# Patient Record
Sex: FEMALE | Race: WHITE | NOT HISPANIC OR LATINO | Employment: OTHER | URBAN - METROPOLITAN AREA
[De-identification: names, ages, dates, MRNs, and addresses within clinical notes are randomized per-mention and may not be internally consistent; named-entity substitution may affect disease eponyms.]

---

## 2017-02-02 ENCOUNTER — COMMUNICATION - HEALTHEAST (OUTPATIENT)
Dept: INTERNAL MEDICINE | Facility: CLINIC | Age: 62
End: 2017-02-02

## 2017-05-29 ENCOUNTER — COMMUNICATION - HEALTHEAST (OUTPATIENT)
Dept: INTERNAL MEDICINE | Facility: CLINIC | Age: 62
End: 2017-05-29

## 2017-09-24 ENCOUNTER — COMMUNICATION - HEALTHEAST (OUTPATIENT)
Dept: INTERNAL MEDICINE | Facility: CLINIC | Age: 62
End: 2017-09-24

## 2018-01-05 ENCOUNTER — COMMUNICATION - HEALTHEAST (OUTPATIENT)
Dept: INTERNAL MEDICINE | Facility: CLINIC | Age: 63
End: 2018-01-05

## 2018-01-05 RX ORDER — CELECOXIB 200 MG/1
CAPSULE ORAL
Qty: 90 CAPSULE | Refills: 1 | Status: SHIPPED | OUTPATIENT
Start: 2018-01-05

## 2018-01-10 ENCOUNTER — COMMUNICATION - HEALTHEAST (OUTPATIENT)
Dept: INTERNAL MEDICINE | Facility: CLINIC | Age: 63
End: 2018-01-10

## 2019-04-24 ENCOUNTER — AMBULATORY - HEALTHEAST (OUTPATIENT)
Dept: CARDIAC REHAB | Facility: HOSPITAL | Age: 64
End: 2019-04-24

## 2019-04-24 DIAGNOSIS — Z95.5 STENTED CORONARY ARTERY: ICD-10-CM

## 2019-04-26 ENCOUNTER — AMBULATORY - HEALTHEAST (OUTPATIENT)
Dept: CARDIAC REHAB | Facility: HOSPITAL | Age: 64
End: 2019-04-26

## 2019-04-26 DIAGNOSIS — Z95.5 STATUS POST INSERTION OF DRUG-ELUTING STENT INTO LEFT ANTERIOR DESCENDING (LAD) ARTERY: ICD-10-CM

## 2019-04-26 DIAGNOSIS — Z95.5 STENTED CORONARY ARTERY: ICD-10-CM

## 2019-04-26 RX ORDER — CLOPIDOGREL BISULFATE 75 MG/1
75 TABLET ORAL DAILY
Status: SHIPPED | COMMUNITY
Start: 2019-04-26

## 2019-04-26 RX ORDER — NITROGLYCERIN 0.4 MG/1
0.4 TABLET SUBLINGUAL EVERY 5 MIN PRN
Status: SHIPPED | COMMUNITY
Start: 2019-04-26

## 2019-04-26 RX ORDER — ASPIRIN 81 MG/1
81 TABLET, CHEWABLE ORAL DAILY
Status: SHIPPED | COMMUNITY
Start: 2019-04-26

## 2019-04-26 RX ORDER — ATORVASTATIN CALCIUM 80 MG/1
80 TABLET, FILM COATED ORAL AT BEDTIME
Status: SHIPPED | COMMUNITY
Start: 2019-04-26

## 2019-05-07 ENCOUNTER — AMBULATORY - HEALTHEAST (OUTPATIENT)
Dept: CARDIAC REHAB | Facility: HOSPITAL | Age: 64
End: 2019-05-07

## 2019-05-07 DIAGNOSIS — Z95.5 STENTED CORONARY ARTERY: ICD-10-CM

## 2019-05-07 DIAGNOSIS — Z95.5 STATUS POST INSERTION OF DRUG-ELUTING STENT INTO LEFT ANTERIOR DESCENDING (LAD) ARTERY: ICD-10-CM

## 2019-05-07 LAB — GLUCOSE BLDC GLUCOMTR-MCNC: 128 MG/DL (ref 70–139)

## 2019-05-08 ENCOUNTER — AMBULATORY - HEALTHEAST (OUTPATIENT)
Dept: CARDIAC REHAB | Facility: HOSPITAL | Age: 64
End: 2019-05-08

## 2019-05-08 DIAGNOSIS — Z95.5 STENTED CORONARY ARTERY: ICD-10-CM

## 2019-05-08 DIAGNOSIS — Z95.5 STATUS POST INSERTION OF DRUG-ELUTING STENT INTO LEFT ANTERIOR DESCENDING (LAD) ARTERY: ICD-10-CM

## 2019-05-14 ENCOUNTER — AMBULATORY - HEALTHEAST (OUTPATIENT)
Dept: CARDIAC REHAB | Facility: HOSPITAL | Age: 64
End: 2019-05-14

## 2019-05-14 DIAGNOSIS — Z95.5 STENTED CORONARY ARTERY: ICD-10-CM

## 2019-05-15 ENCOUNTER — AMBULATORY - HEALTHEAST (OUTPATIENT)
Dept: CARDIAC REHAB | Facility: HOSPITAL | Age: 64
End: 2019-05-15

## 2019-05-15 DIAGNOSIS — Z95.5 STENTED CORONARY ARTERY: ICD-10-CM

## 2019-06-04 ENCOUNTER — AMBULATORY - HEALTHEAST (OUTPATIENT)
Dept: CARDIAC REHAB | Facility: HOSPITAL | Age: 64
End: 2019-06-04

## 2019-06-04 DIAGNOSIS — Z95.5 STENTED CORONARY ARTERY: ICD-10-CM

## 2019-06-05 ENCOUNTER — AMBULATORY - HEALTHEAST (OUTPATIENT)
Dept: CARDIAC REHAB | Facility: HOSPITAL | Age: 64
End: 2019-06-05

## 2019-06-05 DIAGNOSIS — Z95.5 STENTED CORONARY ARTERY: ICD-10-CM

## 2019-06-11 ENCOUNTER — AMBULATORY - HEALTHEAST (OUTPATIENT)
Dept: CARDIAC REHAB | Facility: HOSPITAL | Age: 64
End: 2019-06-11

## 2019-06-11 DIAGNOSIS — Z95.5 STENTED CORONARY ARTERY: ICD-10-CM

## 2019-11-21 ENCOUNTER — COMMUNICATION - HEALTHEAST (OUTPATIENT)
Dept: EMERGENCY MEDICINE | Facility: CLINIC | Age: 64
End: 2019-11-21

## 2019-11-21 DIAGNOSIS — J02.0 STREP THROAT: ICD-10-CM

## 2021-05-26 ENCOUNTER — RECORDS - HEALTHEAST (OUTPATIENT)
Dept: ADMINISTRATIVE | Facility: CLINIC | Age: 66
End: 2021-05-26

## 2021-05-28 ENCOUNTER — RECORDS - HEALTHEAST (OUTPATIENT)
Dept: ADMINISTRATIVE | Facility: CLINIC | Age: 66
End: 2021-05-28

## 2021-05-28 NOTE — PROGRESS NOTES
"ITP ASSESSMENT   Assessment Day: Initial    Session Number: 1&2  Precautions: standard cardiac sx/DM/arthritis    Diagnosis: Stent    Risk Stratification: High    Referring Provider: Adore Chan PA-C  EXERCISE  Exercise Assessment: Initial       6 Minute Walk Test   Pre   Pre Exercise HR: 70                    Pre Exercise BP: 142/80      Peak  Peak HR: 106                   Peak BP: 168/86    Peak feet: 1300    Peak O2 SAT: 98    Peak RPE: 11    Peak MPH: 2.46      Symptoms:  Peak Symptoms: none      5 mins. Post  5 Min Post HR: 74    5 Min Post BP: 140/79                           Exercise Plan  Goals Next 30 days  ADL'S: Pt. will toerate spring yard clean-up without sx.    Leisure: Pt. will return to \"playing and chasing \" with her grandchildren.      Education Goals: All goals in this section met    Education Goals Met: Patient can state cardiac s/s and appropriate emergency response.;Has system for taking medication.;Medication review.      Exercise Prescription  Exercise Mode: Treadmill;Bike;Nustep;Arm Erg.    Frequency: 2x/week    Duration: 40-45 min    Intensity / THR: 20-30 beats above resting heart rate    RPE 11-14  Progression / Met level: 4-5 met level    Resistive Training?: No (due to arthritic hands)      No data recorded    Interventions  Home Exercise:  Mode: walking    Frequency:  daily    Duration: 30-60 min.      Education Material : Educational videos;Provide written material;Individual education and counseling;Offer educational classes      Education Completed  Exercise Education Completed: Cardiac Anatomy;Signs and Symptoms;Medication review;RPE;Emergency Plan;Home Exercise;Warm up/cool down;FITT Principles;BP/HR Reponse to exercise;Benefits of Exercise;End point of exercise              Exercise Follow-up/Discharge  Follow up/Discharge: Pt. has been walking up to 30 min. daily since she was d?c'd from the hospital.   NUTRITION  Nutrition Assessment: Initial      Nutrition Risk " Factors:  Nutrition Risk Factors: Diabetes;Dyslipidemia  HbA1c: 7.4  Monitors blood sugar at home: No  Frequency: 0  Cholesterol: 174  LDL: 108  HDL: 46  Triglycerides: 102      Nutrition Plan  Interventions  Diet Consult: Completed    Goals  Nutrition Goals (Next 30 days): Patient can identify their risk factors for CAD;Review Dietitian schedule;Patient will follow a low sodium diet;Patient will follow a low saturated fat diet      Goals Met  Nutrition Goals Met: Provided Rate your Plate Survey;Completed Nutritional Risk Screen      Height, Weight, and  BMI  Weight: 125 lb (56.7 kg)  Height: 5' (1.524 m)  BMI: 24.41      Nutrition Follow-up  Follow-up/Discharge: Pt. is very interested in a dietary consult and possibly DM resource center education.         Other Risk Factors  Other Risk Factor Assessment: Initial      HTN Risk Factor: Hypertension      Pre Exercise BP: 142/84  Post Exercise BP: 144/78      Hypertension Plan  Goals  HTN Goals: Follow low sodium diet      Goals Met  HTN Goals Met: Exercises regularly;Take medication as prescribed      HTN Interventions  HTN Interventions: Diet consult;Therapist/patient discussion;Provide written material;Offer educational videos;Offer educational classes      Tobacco Risk Factor: NA      Risk Factor Follow-up   Follow-up/Discharge: Will review risk factors.     PSYCHOSOCIAL  Psychosocial Assessment: Initial       [facility] Q of L Summary Score: 22      KARTHIKEYAN-D Score: 8      Psychosocial Risk Factor: Stress      Psychosocial Plan  Interventions  If KARTHIKEYAN-D > 15 send letter to MD  Interventions: Offer educational videos and classes;Provide written material;Individual education and counseling      Education Completed  Education Completed: Relaxation/Coping Techniques;S/S of depression;Effects of stress on body      Goals  Goals (Next 30 days): Patient demonstrates understanding of stress, no goals identified for the next 30 days      Goals Met  Goals Met: Identified  Support system;Practicing stress management skills;Identify stressors      Psychosocial Follow-up  Follow-up/Discharge: Pt. states she has dealt with alot of tragedy and hardship in her life. She has developed a appropriate coping skills.             Patient involved in Goal setting?: Yes      Signature: _____________________________________________________________    Date: __________________    Time: __________________

## 2021-05-28 NOTE — PROGRESS NOTES
Cardiac Rehab  Phase II Assessment    Assessment Date: 4/26/19    Diagnosis: CAD  Date of Onset: 4/22/19  Procedure: STENTS pLAD and dLAD  Date of Onset: 4/22/19  ICD/Pacemaker: No   Post-op Complications: None  ECG History: SR EF%:55-60  Past Medical History: Rheumatoid arthritis-hands, elbows and feet, hypothyroidism, HTN, hyperlipidemia, DM type 2, Positive family hx for early cardiac dz.   Physical Assessment  Precautions/ Physical Limitations: unable to lift more than 15 lbs. Due to arthritis  Oxygen: no  O2 Sats: no Lung Sounds: clear Edema: none  Incisions: good  Sleeping Pattern: fair   Appetite: good   Nutrition Risk Screen: WNL    Pain  Location: hands, elbows, feet  Characteristics:ache  Intensity: (0-10 scale) depending on the day 1-6/10 : 1 today  Current Pain Management: tylenol  Intervention: tylenol PRN  Response: good    Psychosocial/ Emotional Health  1. In the past 12 months, have you been in a relationship where you have been abused physically, emotionally, sexually or financially?no                                                               notified: NA  2. Who do you turn to for emotional support?: , family  3. Do you have cultural or spiritual needs? No  4. Have there been any major life changes in the past 12 months? Yes recently lost her son in Oct. Due to complications from an MI    Referral Information  Primary Physician: Beau Pryor MD  Cardiologist: Dr. Castro      Home exercise/Equipment: no    Patient's long-term goal(s): traveling    1. Living Accommodations: Home Steps: Yes      Support people at home: lives with her    2. Marital Status:   3. Family is able to assist with cares      Sikh/Community involvement: yes  4. Recreation/Hobbies: playing with grand kids, traveling, gardening

## 2021-05-29 NOTE — PROGRESS NOTES
"ITP ASSESSMENT   Assessment Day: 30 Day    Session Number: 6  Precautions: Standard    Diagnosis: Stent    Risk Stratification: High    Referring Provider: Beau Pryor MD  EXERCISE  Exercise Assessment: Reassessment     [place] 40' of exercise at 3.7-4 Mets                         Exercise Plan  Goals Next 30 days  ADL'S: Will set new goals when pt resumes    Leisure: Pt. will return to \"playing and chasing \" with her grandchildren.      Education Goals: All goals in this section met    Education Goals Met: Patient can state cardiac s/s and appropriate emergency response.;Has system for taking medication.;Medication review.                          Goals Met  Initial ADL's goals met: Will evaluate goals when pt resumes      Exercise Prescription  Exercise Mode: Bike;Nustep;Arm Erg.;Treadmill    Frequency: 2 x week    Duration: 40-45'    Intensity / THR: 20-30 beats above resting heart rate    RPE 11-14  Progression / Met level: 3.7-4.5    Resistive Training?: No      No data recorded    Interventions  Home Exercise:  Mode: Walk    Frequency: 5-7 days per week    Duration: 30-60'      Education Material : Educational videos;Provide written material;Individual education and counseling;Offer educational classes      Education Completed  Exercise Education Completed: Cardiac Anatomy;Signs and Symptoms;Medication review;RPE;Emergency Plan;Home Exercise;Warm up/cool down;FITT Principles;BP/HR Reponse to exercise;Benefits of Exercise;End point of exercise              Exercise Follow-up/Discharge  Follow up/Discharge: Will review home exercise when pt resumes           NUTRITION  Nutrition Assessment: Reassessment      Nutrition Risk Factors:  Nutrition Risk Factors: Diabetes;Dyslipidemia  HbA1c: 7.4  Monitors blood sugar at home: No  Frequency: 0  Cholesterol: 174  LDL: 108  HDL: 46  Triglycerides: 102      Nutrition Plan  Interventions  Diet Consult: Completed    Other Nutrition Intervention: Diet " Class;Therapist/Pt Discussion;Educational Videos;Provide with Written Material      Education Completed  Nutrition Education Completed: Carbohydrate counting      Goals  Nutrition Goals (Next 30 days): Provide Rate your Plate Survey      Goals Met  Nutrition Goals Met: Patient able to demonstrate carbohydrate counting      Height, Weight, and  BMI  Weight: 123 lb (55.8 kg)  Height: 5' (1.524 m)  BMI: 24.02      Nutrition Follow-up  Follow-up/Discharge: Pt will complete survey and drop off, would benefit from full review of survey and RD consult       Other Risk Factors  Other Risk Factor Assessment: Reassessment      HTN Risk Factor: Hypertension      Pre Exercise BP: 116/64  Post Exercise BP: 110/64      Hypertension Plan  Goals  HTN Goals: Follow low sodium diet      Goals Met  HTN Goals Met: Exercises regularly;Take medication as prescribed      HTN Interventions  HTN Interventions: Diet consult;Therapist/patient discussion;Provide written material;Offer educational videos;Offer educational classes      HTN Education Completed  HTN Education Completed: Medication review      Tobacco Risk Factor: NA      uc  Risk Factor Follow-up   Follow-up/Discharge: Will provide risk factor education as needed         PSYCHOSOCIAL  Psychosocial Assessment: Reassessment       [facility] Q of L Summary Score: 22      KARTHIKEYAN-D Score: 8      Psychosocial Risk Factor: Stress      Psychosocial Plan  Interventions  Interventions: Offer educational videos and classes;Provide written material;Individual education and counseling      Education Completed  Education Completed: Relaxation/Coping Techniques;S/S of depression;Effects of stress on body      Goals  Goals (Next 30 days): Patient demonstrates understanding of stress, no goals identified for the next 30 days      Goals Met  Goals Met: Identified Support system;Practicing stress management skills;Identify stressors      Psychosocial Follow-up  Follow-up/Discharge: Will review with  pt when she returns           Patient involved in Goal setting?: No      Signature: _____________________________________________________________    Date: __________________    Time: __________________See Doc Flowsheet

## 2021-05-29 NOTE — PROGRESS NOTES
ITP ASSESSMENT   Assessment Day: 60 Day    Session Number: 9  Precautions: standard cardiac symptoms    Diagnosis: Stent    Risk Stratification: High    Referring Provider: Beau Pryor MD  EXERCISE  Exercise Assessment: Reassessment                            Exercise Plan  Goals Next 30 days  ADL'S: Pt. will tolerate 3-4 activities in a day without fatigue.    Leisure: Pt. will begin U/E stregth program.    Education Goals: All goals in this section met                        Goals Met  30 day ADL'S goals met: goal met: Pt. resumed yard work and yard clean-up.    30 day Leisure goals met: Goal met: Pt. able to play with grandchildren.    No data recorded  30 Day Progression: Pt. has come to the conclusion that she is much more fatigued than she was prior to her heart event.  She feels she should be able to do multiple activiities in a day without fatigue. She currently is able to tolerate 1-2 activities without fatigue..She also feels he upper bodi streng has decreased., she finds it difficult to carry a case of water and to open a single bottle.      Initial ADL's goals met: Will evaluate goals when pt resumes      Exercise Prescription  Exercise Mode: Bike;Nustep;Arm Erg.;Treadmill    Frequency: 2x/week    Duration: 40-50 min    Intensity / THR: 20-30 beats above resting heart rate    RPE 11-14  Progression / Met level: 3-5    Resistive Training?: No      Current Exercise (mins/week): 210      Interventions  Home Exercise:  Mode: walking    Frequency: daily    Duration: 30-60 min      Education Material : Educational videos;Provide written material;Individual education and counseling;Offer educational classes      Education Completed  Exercise Education Completed: Cardiac Anatomy;Signs and Symptoms;Medication review;RPE;Emergency Plan;Home Exercise;Warm up/cool down;FITT Principles;BP/HR Reponse to exercise;Benefits of Exercise;End point of exercise;Strength training              Exercise  Follow-up/Discharge  Follow up/Discharge: Pt. has developed a program of daily walks. She will start U/E wts. at home.   NUTRITION  Nutrition Assessment: Reassessment      Nutrition Risk Factors:  Nutrition Risk Factors: Diabetes;Dyslipidemia  HbA1c: 7.4  Monitors blood sugar at home: No  Cholesterol: 174  LDL: 108  HDL: 46  Triglycerides: 102      Nutrition Plan  Interventions  Diet Consult: Completed    Other Nutrition Intervention: Diet Class;Therapist/Pt Discussion;Educational Videos;Provide with Written Material      Education Completed  Nutrition Education Completed: Carbohydrate counting;Low Saturated fat diet;Risk factor overview      Goals  Nutrition Goals (Next 30 days): Patient will follow a low saturated fat diet;Patient will follow a low sodium diet;Patient knows appropriate portion size      Goals Met  Nutrition Goals Met: Patient able to demonstrate carbohydrate counting;Reviewed Dietitian schedule;Provided Rate your Plate Survey;Completed Nutritional Risk Screen      Height, Weight, and  BMI  Weight: 121 lb (54.9 kg)  Height: 5' (1.524 m)  BMI: 23.63      Nutrition Follow-up  Follow-up/Discharge: Pt. will meet with dietitian.         Other Risk Factors  Other Risk Factor Assessment: Reassessment      HTN Risk Factor: Hypertension      Pre Exercise BP: 120/66  Post Exercise BP: 120/68      Hypertension Plan  Goals  HTN Goals: Patient demonstrates understanding of HTN, no goals identified for the next 30 days      Goals Met  HTN Goals Met: Exercises regularly;Take medication as prescribed;Follow low sodium diet      HTN Interventions  HTN Interventions: Diet consult;Therapist/patient discussion;Provide written material;Offer educational videos;Offer educational classes      HTN Education Completed  HTN Education Completed: Medication review;Low sodium diet      Tobacco Risk Factor: NA      Risk Factor Follow-up   Follow-up/Discharge: Pt. has made many positive dietary changes, realizing she had  previously not taken her DM seriously.     PSYCHOSOCIAL  Psychosocial Assessment: Reassessment       No data recorded    No data recorded    Psychosocial Risk Factor: Stress      Psychosocial Plan  Interventions  Interventions: Offer educational videos and classes;Provide written material;Individual education and counseling      Education Completed  Education Completed: Relaxation/Coping Techniques;S/S of depression;Effects of stress on body      Goals  Goals (Next 30 days): Practicing stress management skills      Goals Met  Goals Met: Identified Support system;Practicing stress management skills;Identify stressors;Oriented to stress management classes      Psychosocial Follow-up  Follow-up/Discharge: Will review with pt when she returns             Patient involved in Goal setting?: Yes      Signature: _____________________________________________________________    Date: __________________    Time: __________________

## 2021-05-30 NOTE — PROGRESS NOTES
ITP ASSESSMENT   Assessment Day: 90 Day    Session Number: 9  Precautions: Standard Cardiac    Diagnosis: Stent    Risk Stratification: High    Referring Provider: Beau Pryor MD  EXERCISE  Exercise Assessment: Reassessment  Pt is not available to set new goals. Will reassess when pt returns.                         Exercise Plan  Goals Next 30 days  ADL'S: Will set new goals when pt returns.     Leisure: Pt. will begin U/E stregth program.    Education Goals: All goals in this section met      60 Day Progression: Pt not available, will reassess when pt returns.       30 day ADL'S goals met: goal met: Pt. resumed yard work and yard clean-up.    30 day Leisure goals met: Goal met: Pt. able to play with grandchildren.    30 Day Progression: Pt. has come to the conclusion that she is much more fatigued than she was prior to her heart event.  She feels she should be able to do multiple activiities in a day without fatigue. She currently is able to tolerate 1-2 activities without fatigue..She also feels he upper bodi streng has decreased., she finds it difficult to carry a case of water and to open a single bottle.      Initial ADL's goals met: Will evaluate goals when pt resumes    Exercise Prescription  Exercise Mode: Bike;Nustep;Arm Erg.;Treadmill    Frequency: 2x/week    Duration: 40-50 minutes    Intensity / THR: 20-30 beats above resting heart rate    RPE 11-14  Progression / Met level: 3-5    Resistive Training?: No      Current Exercise (mins/week): 210      Interventions  Home Exercise:  Mode: Walking     Frequency: Daily     Duration: 30-60 minutes      Education Material : Educational videos;Provide written material;Individual education and counseling;Offer educational classes      Education Completed  Exercise Education Completed: Cardiac Anatomy;Signs and Symptoms;Medication review;RPE;Emergency Plan;Home Exercise;Warm up/cool down;FITT Principles;BP/HR Reponse to exercise;Benefits of Exercise;End  point of exercise;Strength training              Exercise Follow-up/Discharge  Follow up/Discharge: Will review home exercise when pt returns.   NUTRITION  Nutrition Assessment: Reassessment      Nutrition Risk Factors:  Nutrition Risk Factors: Diabetes;Dyslipidemia  HbA1c: 7.4  Monitors blood sugar at home: No  Cholesterol: 174  LDL: 108  HDL: 46  Triglycerides: 102      Nutrition Plan  Interventions  Diet Consult: Completed    Other Nutrition Intervention: Diet Class;Therapist/Pt Discussion;Educational Videos;Provide with Written Material      Education Completed  Nutrition Education Completed: Carbohydrate counting;Low Saturated fat diet;Risk factor overview      Goals  Nutrition Goals (Next 30 days): Patient will follow a low saturated fat diet;Patient will follow a low sodium diet;Patient knows appropriate portion size      Goals Met  Nutrition Goals Met: Patient able to demonstrate carbohydrate counting;Reviewed Dietitian schedule;Provided Rate your Plate Survey;Completed Nutritional Risk Screen      Height, Weight, and  BMI  Weight: 121 lb (54.9 kg)  Height: 5' (1.524 m)  BMI: 23.63      Nutrition Follow-up  Follow-up/Discharge: Pt. will meet with dietitian.         Other Risk Factors  Other Risk Factor Assessment: Reassessment      HTN Risk Factor: Hypertension      Pre Exercise BP: 120/66  Post Exercise BP: 120/68      Hypertension Plan  Goals  HTN Goals: Patient demonstrates understanding of HTN, no goals identified for the next 30 days      Goals Met  HTN Goals Met: Exercises regularly;Take medication as prescribed;Follow low sodium diet      HTN Interventions  HTN Interventions: Diet consult;Therapist/patient discussion;Provide written material;Offer educational videos;Offer educational classes      HTN Education Completed  HTN Education Completed: Medication review;Low sodium diet      Tobacco Risk Factor: NA      Risk Factor Follow-up   Follow-up/Discharge: Will provide risk factor education as  needed.     PSYCHOSOCIAL  Psychosocial Assessment: Reassessment       [facility] TIA Q of L Summary Score: 22      Psychosocial Risk Factor: Stress      Psychosocial Plan  Interventions  Interventions: Offer educational videos and classes;Provide written material;Individual education and counseling      Education Completed  Education Completed: Relaxation/Coping Techniques;S/S of depression;Effects of stress on body      Goals  Goals (Next 30 days): Practicing stress management skills      Goals Met  Goals Met: Identified Support system;Practicing stress management skills;Identify stressors;Oriented to stress management classes      Psychosocial Follow-up  Follow-up/Discharge: Will review with pt when she returns              Patient involved in Goal setting?: No      Signature: _____________________________________________________________    Date: __________________    Time: __________________See Doc Flowsheet

## 2021-05-30 NOTE — PROGRESS NOTES
ITP ASSESSMENT   Assessment Day: 120 Day - Discharge Note    Session Number: 9  Precautions: Standard    Diagnosis: Stent    Risk Stratification: High    Referring Provider: Beau Pryor MD  EXERCISE  Exercise Assessment: Discharge     Pt completed 9 sessions of cardiac rehab.Peak of 5.1 METs. Inconsistent with attendance, but progressed well. Per cancellation policy, chart discharged.                         Exercise Plan                        Goals Met  60 Day Progression: Pt not available, will reassess when pt returns.       30 day ADL'S goals met: goal met: Pt. resumed yard work and yard clean-up.    30 day Leisure goals met: Goal met: Pt. able to play with grandchildren.    30 Day Progression: Pt. has come to the conclusion that she is much more fatigued than she was prior to her heart event.  She feels she should be able to do multiple activiities in a day without fatigue. She currently is able to tolerate 1-2 activities without fatigue..She also feels he upper bodi streng has decreased., she finds it difficult to carry a case of water and to open a single bottle.      Initial ADL's goals met: Will evaluate goals when pt resumes      Interventions  Home Exercise:  Mode: Walking     Frequency: Daily     Duration: 30-60 minutes      Education Material : Educational videos;Provide written material;Individual education and counseling;Offer educational classes      Education Completed  Exercise Education Completed: Cardiac Anatomy;Signs and Symptoms;Medication review;RPE;Emergency Plan;Home Exercise;Warm up/cool down;FITT Principles;BP/HR Reponse to exercise;Benefits of Exercise;End point of exercise;Strength training              Exercise Follow-up/Discharge  Follow up/Discharge: Will review home exercise when pt returns.   NUTRITION  Nutrition Assessment: Discharge      Nutrition Risk Factors:  Nutrition Risk Factors: Diabetes;Dyslipidemia      Nutrition Plan  Interventions  Diet Consult:  Completed    Other Nutrition Intervention: Diet Class;Therapist/Pt Discussion;Educational Videos;Provide with Written Material      Education Completed  Nutrition Education Completed: Carbohydrate counting;Low Saturated fat diet;Risk factor overview      Goals Met  Nutrition Goals Met: Patient able to demonstrate carbohydrate counting;Reviewed Dietitian schedule;Provided Rate your Plate Survey;Completed Nutritional Risk Screen      Height, Weight, and  BMI  Weight: 121 lb (54.9 kg)  Height: 5' (1.524 m)  BMI: 23.63      Nutrition Follow-up  Follow-up/Discharge: Pt. will meet with dietitian.         Other Risk Factors  Other Risk Factor Assessment: Discharge      HTN Risk Factor: Hypertension      Pre Exercise BP: 120/66  Post Exercise BP: 120/68      Hypertension Plan    Goals Met  HTN Goals Met: Exercises regularly;Take medication as prescribed;Follow low sodium diet      HTN Interventions  HTN Interventions: Diet consult;Therapist/patient discussion;Provide written material;Offer educational videos;Offer educational classes      HTN Education Completed  HTN Education Completed: Medication review;Low sodium diet      Tobacco Risk Factor: NA      Risk Factor Follow-up   Follow-up/Discharge: Will provide risk factor education as needed.     PSYCHOSOCIAL  Psychosocial Assessment: Discharge       Lee WEBER Q of L Summary Score: 22        Psychosocial Risk Factor: Stress      Psychosocial Plan  Interventions  Interventions: Offer educational videos and classes;Provide written material;Individual education and counseling      Education Completed  Education Completed: Relaxation/Coping Techniques;S/S of depression;Effects of stress on body      Goals Met  Goals Met: Identified Support system;Practicing stress management skills;Identify stressors;Oriented to stress management classes      Psychosocial Follow-up  Follow-up/Discharge: Will review with pt when she returns              Patient involved in Goal setting?:  No      Signature: _____________________________________________________________    Date: __________________    Time: __________________

## 2021-06-02 ENCOUNTER — RECORDS - HEALTHEAST (OUTPATIENT)
Dept: ADMINISTRATIVE | Facility: CLINIC | Age: 66
End: 2021-06-02

## 2021-06-03 VITALS — BODY MASS INDEX: 24.02 KG/M2 | WEIGHT: 123 LBS

## 2021-06-03 VITALS — BODY MASS INDEX: 23.83 KG/M2 | WEIGHT: 122 LBS

## 2021-06-03 VITALS — WEIGHT: 121 LBS | BODY MASS INDEX: 23.63 KG/M2

## 2021-06-03 VITALS — BODY MASS INDEX: 24.41 KG/M2 | WEIGHT: 125 LBS

## 2021-06-03 VITALS — WEIGHT: 124 LBS | BODY MASS INDEX: 24.22 KG/M2

## 2021-06-03 VITALS — WEIGHT: 125 LBS | BODY MASS INDEX: 24.41 KG/M2

## 2021-06-03 NOTE — TELEPHONE ENCOUNTER
Discussed results of positive strep culture with the patient.  Reviewed need for antibiotics and reviewed her allergies to penicillin.  Azithromycin sent to patient's requested pharmacy at the [facility] in [place].  We discussed the importance of taking every pill and finishing the course as prescribed.  Recommended follow-up with her primary care provider to ensure her symptoms are improving.  Patient voiced understanding.